# Patient Record
Sex: FEMALE | Race: WHITE | NOT HISPANIC OR LATINO | Employment: UNEMPLOYED | URBAN - METROPOLITAN AREA
[De-identification: names, ages, dates, MRNs, and addresses within clinical notes are randomized per-mention and may not be internally consistent; named-entity substitution may affect disease eponyms.]

---

## 2010-11-16 LAB
EXT 24 HR UR METANEPHRINE: ABNORMAL
EXT 24 HR UR NORMETANEPHRINE: ABNORMAL
EXT 24 HR UR NORMETANEPHRINE: ABNORMAL
EXT 25 HYDROXY VIT D2: ABNORMAL
EXT 25 HYDROXY VIT D3: ABNORMAL
EXT 5 HIAA 24 HR URINE: ABNORMAL
EXT 5 HIAA BLOOD: ABNORMAL
EXT ACTH: ABNORMAL
EXT AFP: ABNORMAL
EXT ALBUMIN: ABNORMAL
EXT ALKALINE PHOSPHATASE: ABNORMAL
EXT ALT: ABNORMAL
EXT AMYLASE: ABNORMAL
EXT ANTI ISLET CELL AB: ABNORMAL
EXT ANTI PARIETAL CELL AB: ABNORMAL
EXT ANTI THYROID AB: ABNORMAL
EXT AST: ABNORMAL
EXT BILIRUBIN DIRECT: ABNORMAL
EXT BILIRUBIN TOTAL: ABNORMAL
EXT BK VIRUS DNA QN PCR: ABNORMAL
EXT BUN: ABNORMAL
EXT C PEPTIDE: ABNORMAL
EXT CA 125: ABNORMAL
EXT CA 19-9: ABNORMAL
EXT CA 27-29: ABNORMAL
EXT CALCITONIN: ABNORMAL
EXT CALCIUM: ABNORMAL
EXT CEA: ABNORMAL
EXT CHLORIDE: ABNORMAL
EXT CHOLESTEROL: ABNORMAL
EXT CHROMOGRANIN A: ABNORMAL
EXT CO2: ABNORMAL
EXT CREATININE UA: ABNORMAL
EXT CREATININE: ABNORMAL
EXT CYCLOSPORONE LEVEL: ABNORMAL
EXT DOPAMINE: ABNORMAL
EXT EBV DNA BY PCR: ABNORMAL
EXT EPINEPHRINE: ABNORMAL
EXT FOLATE: ABNORMAL
EXT FREE T3: ABNORMAL
EXT FREE T4: ABNORMAL
EXT FSH: ABNORMAL
EXT GASTRIN RELEASING PEPTIDE: ABNORMAL
EXT GASTRIN RELEASING PEPTIDE: ABNORMAL
EXT GASTRIN: ABNORMAL
EXT GGT: ABNORMAL
EXT GHRELIN: ABNORMAL
EXT GLUCAGON: ABNORMAL
EXT GLUCOSE: ABNORMAL
EXT GROWTH HORMONE: ABNORMAL
EXT HCV RNA QUANT PCR: ABNORMAL
EXT HDL: ABNORMAL
EXT HEMATOCRIT: ABNORMAL
EXT HEMOGLOBIN A1C: ABNORMAL
EXT HEMOGLOBIN: ABNORMAL
EXT HISTAMINE 24 HR URINE: ABNORMAL
EXT HISTAMINE: ABNORMAL
EXT IGF-1: ABNORMAL
EXT IMMUNKNOW (NON-STIMULATED): ABNORMAL
EXT IMMUNKNOW (STIMULATED): ABNORMAL
EXT INR: ABNORMAL
EXT INSULIN: ABNORMAL
EXT LANREOTIDE LEVEL: ABNORMAL
EXT LDH, TOTAL: ABNORMAL
EXT LDL CHOLESTEROL: ABNORMAL
EXT LIPASE: ABNORMAL
EXT MAGNESIUM: ABNORMAL
EXT METANEPHRINE FREE PLASMA: ABNORMAL
EXT MOTILIN: ABNORMAL
EXT NEUROKININ A CAMB: ABNORMAL
EXT NEUROKININ A ISI: ABNORMAL
EXT NEUROTENSIN: ABNORMAL
EXT NOREPINEPHRINE: ABNORMAL
EXT NORMETANEPHRINE: ABNORMAL
EXT NSE: ABNORMAL
EXT OCTREOTIDE LEVEL: ABNORMAL
EXT PANCREASTATIN CAMB: ABNORMAL
EXT PANCREASTATIN ISI: ABNORMAL
EXT PANCREATIC POLYPEPTIDE: ABNORMAL
EXT PHOSPHORUS: ABNORMAL
EXT PLATELETS: ABNORMAL
EXT POTASSIUM: ABNORMAL
EXT PROGRAF LEVEL: ABNORMAL
EXT PROLACTIN: ABNORMAL
EXT PROTEIN TOTAL: ABNORMAL
EXT PROTEIN UA: ABNORMAL
EXT PT: ABNORMAL
EXT PTH, INTACT: ABNORMAL
EXT PTT: ABNORMAL
EXT RAPAMUNE LEVEL: ABNORMAL
EXT SEROTONIN: ABNORMAL
EXT SODIUM: ABNORMAL
EXT SOMATOSTATIN: ABNORMAL
EXT SUBSTANCE P: ABNORMAL
EXT TRIGLYCERIDES: ABNORMAL
EXT TRYPTASE: ABNORMAL
EXT TSH: ABNORMAL
EXT URIC ACID: ABNORMAL
EXT URINE AMYLASE U/HR: ABNORMAL
EXT URINE AMYLASE U/L: ABNORMAL
EXT VASOACTIVE INTESTINAL POLYPEPTIDE: ABNORMAL
EXT VITAMIN B12: ABNORMAL
EXT VMA 24 HR URINE: ABNORMAL
EXT WBC: ABNORMAL
NEURON SPECIFIC ENOLASE: ABNORMAL
VITAMIN D, 25-OH, TOTAL: 18.2 NG/ML (ref 32–100)

## 2020-11-18 LAB

## 2021-02-23 LAB

## 2021-03-18 ENCOUNTER — DOCUMENTATION ONLY (OUTPATIENT)
Dept: NEUROLOGY | Facility: HOSPITAL | Age: 46
End: 2021-03-18

## 2021-03-24 DIAGNOSIS — C7A.020 MALIGNANT CARCINOID TUMOR OF APPENDIX: Primary | ICD-10-CM

## 2021-03-24 DIAGNOSIS — C26.9 MALIGNANT NEOPLASM OF ILL-DEFINED SITES WITHIN THE DIGESTIVE SYSTEM: ICD-10-CM

## 2021-03-29 ENCOUNTER — TELEPHONE (OUTPATIENT)
Dept: NEUROLOGY | Facility: HOSPITAL | Age: 46
End: 2021-03-29

## 2021-03-31 ENCOUNTER — LAB VISIT (OUTPATIENT)
Dept: LAB | Facility: HOSPITAL | Age: 46
End: 2021-03-31
Attending: FAMILY MEDICINE
Payer: COMMERCIAL

## 2021-03-31 ENCOUNTER — HOSPITAL ENCOUNTER (OUTPATIENT)
Dept: RADIOLOGY | Facility: HOSPITAL | Age: 46
Discharge: HOME | End: 2021-03-31
Attending: SURGERY
Payer: COMMERCIAL

## 2021-03-31 DIAGNOSIS — C7A.020 MALIGNANT CARCINOID TUMOR OF THE APPENDIX: Primary | ICD-10-CM

## 2021-03-31 DIAGNOSIS — C26.9 MALIGNANT NEOPLASM OF ILL-DEFINED SITES WITHIN THE DIGESTIVE SYSTEM: ICD-10-CM

## 2021-03-31 LAB
ALBUMIN SERPL BCP-MCNC: 4.3 G/DL (ref 3.5–5.2)
ALP SERPL-CCNC: 66 U/L (ref 55–135)
ALT SERPL W/O P-5'-P-CCNC: 104 U/L (ref 10–44)
ANION GAP SERPL CALC-SCNC: 12 MMOL/L (ref 8–16)
AST SERPL-CCNC: 98 U/L (ref 10–40)
BILIRUB SERPL-MCNC: 1 MG/DL (ref 0.1–1)
BUN SERPL-MCNC: 12 MG/DL (ref 6–20)
CALCIUM SERPL-MCNC: 9.8 MG/DL (ref 8.7–10.5)
CHLORIDE SERPL-SCNC: 101 MMOL/L (ref 95–110)
CO2 SERPL-SCNC: 22 MMOL/L (ref 23–29)
CREAT SERPL-MCNC: 0.7 MG/DL (ref 0.5–1.4)
EST. GFR  (AFRICAN AMERICAN): >60 ML/MIN/1.73 M^2
EST. GFR  (NON AFRICAN AMERICAN): >60 ML/MIN/1.73 M^2
GLUCOSE SERPL-MCNC: 96 MG/DL (ref 70–110)
POTASSIUM SERPL-SCNC: 4.4 MMOL/L (ref 3.5–5.1)
PROT SERPL-MCNC: 7.7 G/DL (ref 6–8.4)
SODIUM SERPL-SCNC: 135 MMOL/L (ref 136–145)

## 2021-03-31 PROCEDURE — 74177 CT ABD & PELVIS W/CONTRAST: CPT | Mod: TC,PO

## 2021-03-31 PROCEDURE — 83519 RIA NONANTIBODY: CPT | Performed by: FAMILY MEDICINE

## 2021-03-31 PROCEDURE — 80053 COMPREHEN METABOLIC PANEL: CPT | Performed by: FAMILY MEDICINE

## 2021-03-31 PROCEDURE — 36415 COLL VENOUS BLD VENIPUNCTURE: CPT | Performed by: FAMILY MEDICINE

## 2021-03-31 PROCEDURE — 25500020 PHARM REV CODE 255: Mod: PO | Performed by: SURGERY

## 2021-03-31 PROCEDURE — 84260 ASSAY OF SEROTONIN: CPT | Performed by: FAMILY MEDICINE

## 2021-03-31 PROCEDURE — 30000890 LABCORP MISCELLANEOUS TEST: Performed by: FAMILY MEDICINE

## 2021-03-31 RX ADMIN — IOHEXOL 100 ML: 350 INJECTION, SOLUTION INTRAVENOUS at 08:03

## 2021-04-01 ENCOUNTER — TELEPHONE (OUTPATIENT)
Dept: NEUROLOGY | Facility: HOSPITAL | Age: 46
End: 2021-04-01

## 2021-04-01 ENCOUNTER — OFFICE VISIT (OUTPATIENT)
Dept: NEUROLOGY | Facility: HOSPITAL | Age: 46
End: 2021-04-01
Attending: SURGERY
Payer: COMMERCIAL

## 2021-04-01 VITALS
SYSTOLIC BLOOD PRESSURE: 132 MMHG | HEIGHT: 68 IN | WEIGHT: 223.44 LBS | DIASTOLIC BLOOD PRESSURE: 83 MMHG | TEMPERATURE: 99 F | HEART RATE: 88 BPM | BODY MASS INDEX: 33.86 KG/M2

## 2021-04-01 DIAGNOSIS — C26.9 MALIGNANT NEOPLASM OF ILL-DEFINED SITES WITHIN THE DIGESTIVE SYSTEM: Primary | ICD-10-CM

## 2021-04-01 DIAGNOSIS — C7A.020 MALIGNANT CARCINOID TUMOR OF APPENDIX: ICD-10-CM

## 2021-04-01 DIAGNOSIS — C7A.020 MALIGNANT CARCINOID TUMOR OF APPENDIX: Primary | ICD-10-CM

## 2021-04-01 PROCEDURE — 99213 OFFICE O/P EST LOW 20 MIN: CPT | Performed by: SURGERY

## 2021-04-01 RX ORDER — MONTELUKAST SODIUM 10 MG/1
TABLET ORAL
COMMUNITY
Start: 2020-10-21

## 2021-04-01 RX ORDER — CETIRIZINE HYDROCHLORIDE 10 MG/1
10 TABLET ORAL
COMMUNITY
Start: 2020-10-21

## 2021-04-01 RX ORDER — ACETAMINOPHEN 500 MG
5000 TABLET ORAL
COMMUNITY

## 2021-04-01 RX ORDER — METFORMIN HYDROCHLORIDE 500 MG/1
TABLET ORAL
COMMUNITY
Start: 2021-01-13

## 2021-04-01 RX ORDER — LEVOTHYROXINE SODIUM 175 UG/1
TABLET ORAL
COMMUNITY
Start: 2020-10-21 | End: 2022-04-05

## 2021-04-01 RX ORDER — EZETIMIBE 10 MG/1
TABLET ORAL
COMMUNITY
Start: 2020-10-21 | End: 2021-10-04

## 2021-04-01 RX ORDER — ROSUVASTATIN CALCIUM 10 MG/1
10 TABLET, COATED ORAL
COMMUNITY
Start: 2021-01-13 | End: 2022-01-13

## 2021-04-09 LAB — SEROTONIN SER-MCNC: 5 NG/ML (ref 0–420)

## 2021-04-15 LAB
LABCORP MISC TEST CODE: NORMAL
LABCORP MISC TEST NAME: NORMAL
LABCORP MISCELLANEOUS TEST: NORMAL

## 2021-04-24 PROBLEM — C7A.020 MALIGNANT CARCINOID TUMOR OF APPENDIX: Status: ACTIVE | Noted: 2021-04-24

## 2021-06-14 ENCOUNTER — TELEPHONE (OUTPATIENT)
Dept: NEUROLOGY | Facility: HOSPITAL | Age: 46
End: 2021-06-14

## 2021-09-02 ENCOUNTER — PATIENT MESSAGE (OUTPATIENT)
Dept: NEUROLOGY | Facility: HOSPITAL | Age: 46
End: 2021-09-02

## 2021-09-02 ENCOUNTER — NURSE TRIAGE (OUTPATIENT)
Dept: ADMINISTRATIVE | Facility: CLINIC | Age: 46
End: 2021-09-02

## 2021-09-03 LAB
EXT 24 HR UR METANEPHRINE: ABNORMAL
EXT 24 HR UR NORMETANEPHRINE: ABNORMAL
EXT 24 HR UR NORMETANEPHRINE: ABNORMAL
EXT 25 HYDROXY VIT D2: ABNORMAL
EXT 25 HYDROXY VIT D3: ABNORMAL
EXT 5 HIAA 24 HR URINE: ABNORMAL
EXT 5 HIAA BLOOD: ABNORMAL
EXT ACTH: ABNORMAL
EXT AFP: ABNORMAL
EXT ALBUMIN: 4.7 G/DL (ref 3.8–4.8)
EXT ALKALINE PHOSPHATASE: 73 IU/L (ref 48–121)
EXT ALT: 67 IU/L (ref 0–32)
EXT AMYLASE: ABNORMAL
EXT ANTI ISLET CELL AB: ABNORMAL
EXT ANTI PARIETAL CELL AB: ABNORMAL
EXT ANTI THYROID AB: ABNORMAL
EXT AST: 65 IU/L (ref 0–40)
EXT BILIRUBIN DIRECT: ABNORMAL
EXT BILIRUBIN TOTAL: 0.3 MG/DL (ref 0–1.2)
EXT BK VIRUS DNA QN PCR: ABNORMAL
EXT BUN: 14 MG/DL (ref 6–24)
EXT C PEPTIDE: ABNORMAL
EXT CA 125: ABNORMAL
EXT CA 19-9: ABNORMAL
EXT CA 27-29: ABNORMAL
EXT CALCITONIN: ABNORMAL
EXT CALCIUM: 10.4 MG/DL (ref 8.7–10.2)
EXT CEA: ABNORMAL
EXT CHLORIDE: 103 MMOL/L (ref 96–106)
EXT CHOLESTEROL: ABNORMAL
EXT CHROMOGRANIN A: ABNORMAL
EXT CO2: 23 MMOL/L (ref 20–29)
EXT CREATININE UA: ABNORMAL
EXT CREATININE: 0.7 MG/DL (ref 0.57–1)
EXT CYCLOSPORONE LEVEL: ABNORMAL
EXT DOPAMINE: ABNORMAL
EXT EBV DNA BY PCR: ABNORMAL
EXT EPINEPHRINE: ABNORMAL
EXT FOLATE: ABNORMAL
EXT FREE T3: ABNORMAL
EXT FREE T4: ABNORMAL
EXT FSH: ABNORMAL
EXT GASTRIN RELEASING PEPTIDE: ABNORMAL
EXT GASTRIN RELEASING PEPTIDE: ABNORMAL
EXT GASTRIN: ABNORMAL
EXT GGT: ABNORMAL
EXT GHRELIN: ABNORMAL
EXT GLUCAGON: ABNORMAL
EXT GLUCOSE: 95 MG/DL (ref 65–99)
EXT GROWTH HORMONE: ABNORMAL
EXT HCV RNA QUANT PCR: ABNORMAL
EXT HDL: ABNORMAL
EXT HEMATOCRIT: 39 % (ref 34–46.6)
EXT HEMOGLOBIN A1C: ABNORMAL
EXT HEMOGLOBIN: 12.5 G/DL (ref 11.1–15.9)
EXT HISTAMINE 24 HR URINE: ABNORMAL
EXT HISTAMINE: ABNORMAL
EXT IGF-1: ABNORMAL
EXT IMMUNKNOW (NON-STIMULATED): ABNORMAL
EXT IMMUNKNOW (STIMULATED): ABNORMAL
EXT INR: ABNORMAL
EXT INSULIN: ABNORMAL
EXT LANREOTIDE LEVEL: ABNORMAL
EXT LDH, TOTAL: ABNORMAL
EXT LDL CHOLESTEROL: ABNORMAL
EXT LIPASE: ABNORMAL
EXT MAGNESIUM: ABNORMAL
EXT METANEPHRINE FREE PLASMA: ABNORMAL
EXT MOTILIN: ABNORMAL
EXT NEUROKININ A CAMB: ABNORMAL
EXT NEUROKININ A ISI: ABNORMAL
EXT NEUROTENSIN: ABNORMAL
EXT NOREPINEPHRINE: ABNORMAL
EXT NORMETANEPHRINE: ABNORMAL
EXT NSE: ABNORMAL
EXT OCTREOTIDE LEVEL: ABNORMAL
EXT PANCREASTATIN CAMB: ABNORMAL
EXT PANCREASTATIN ISI: 60 PG/ML (ref 10–135)
EXT PANCREATIC POLYPEPTIDE: ABNORMAL
EXT PHOSPHORUS: ABNORMAL
EXT PLATELETS: 304 X10E3/UL (ref 150–450)
EXT POTASSIUM: 4.4 MMOL/L (ref 3.5–5.2)
EXT PROGRAF LEVEL: ABNORMAL
EXT PROLACTIN: ABNORMAL
EXT PROTEIN TOTAL: 7.3 G/DL (ref 6–8.5)
EXT PROTEIN UA: ABNORMAL
EXT PT: ABNORMAL
EXT PTH, INTACT: ABNORMAL
EXT PTT: ABNORMAL
EXT RAPAMUNE LEVEL: ABNORMAL
EXT SEROTONIN: 7 NG/ML (ref 0–420)
EXT SODIUM: 142 MMOL/L (ref 134–144)
EXT SOMATOSTATIN: ABNORMAL
EXT SUBSTANCE P: ABNORMAL
EXT TRIGLYCERIDES: ABNORMAL
EXT TRYPTASE: ABNORMAL
EXT TSH: ABNORMAL
EXT URIC ACID: ABNORMAL
EXT URINE AMYLASE U/HR: ABNORMAL
EXT URINE AMYLASE U/L: ABNORMAL
EXT VASOACTIVE INTESTINAL POLYPEPTIDE: ABNORMAL
EXT VITAMIN B12: ABNORMAL
EXT VMA 24 HR URINE: ABNORMAL
EXT WBC: 7.7 X10E3/UL (ref 3.4–10.8)
NEURON SPECIFIC ENOLASE: ABNORMAL

## 2021-10-04 ENCOUNTER — OFFICE VISIT (OUTPATIENT)
Dept: NEUROLOGY | Facility: HOSPITAL | Age: 46
End: 2021-10-04
Attending: SURGERY
Payer: COMMERCIAL

## 2021-10-04 ENCOUNTER — PATIENT MESSAGE (OUTPATIENT)
Dept: NEUROLOGY | Facility: HOSPITAL | Age: 46
End: 2021-10-04

## 2021-10-04 ENCOUNTER — HOSPITAL ENCOUNTER (OUTPATIENT)
Dept: RADIOLOGY | Facility: HOSPITAL | Age: 46
Discharge: HOME | End: 2021-10-04
Attending: SURGERY
Payer: COMMERCIAL

## 2021-10-04 VITALS
HEART RATE: 94 BPM | WEIGHT: 214.31 LBS | RESPIRATION RATE: 16 BRPM | TEMPERATURE: 99 F | SYSTOLIC BLOOD PRESSURE: 152 MMHG | OXYGEN SATURATION: 97 % | BODY MASS INDEX: 32.48 KG/M2 | DIASTOLIC BLOOD PRESSURE: 83 MMHG | HEIGHT: 68 IN

## 2021-10-04 DIAGNOSIS — K76.0 FATTY LIVER: ICD-10-CM

## 2021-10-04 DIAGNOSIS — Z12.89 ENCOUNTER FOR SCREENING FOR MALIGNANT NEOPLASM OF OTHER SITES: ICD-10-CM

## 2021-10-04 DIAGNOSIS — C7A.020 MALIGNANT CARCINOID TUMOR OF APPENDIX: ICD-10-CM

## 2021-10-04 DIAGNOSIS — C7A.020 MALIGNANT CARCINOID TUMOR OF APPENDIX: Primary | ICD-10-CM

## 2021-10-04 DIAGNOSIS — C26.9 MALIGNANT NEOPLASM OF ILL-DEFINED SITES WITHIN THE DIGESTIVE SYSTEM: ICD-10-CM

## 2021-10-04 LAB
CREAT SERPL-MCNC: 0.6 MG/DL (ref 0.5–1.4)
SAMPLE: NORMAL

## 2021-10-04 PROCEDURE — 25500020 PHARM REV CODE 255: Performed by: SURGERY

## 2021-10-04 PROCEDURE — G1004 CDSM NDSC: HCPCS

## 2021-10-04 PROCEDURE — 99215 OFFICE O/P EST HI 40 MIN: CPT | Mod: 25 | Performed by: SURGERY

## 2021-10-04 PROCEDURE — G1004 CT ABDOMEN PELVIS WITH CONTRAST: ICD-10-PCS | Mod: ,,, | Performed by: RADIOLOGY

## 2021-10-04 PROCEDURE — 74177 CT ABDOMEN PELVIS WITH CONTRAST: ICD-10-PCS | Mod: 26,MG,, | Performed by: RADIOLOGY

## 2021-10-04 PROCEDURE — G1004 CDSM NDSC: HCPCS | Mod: ,,, | Performed by: RADIOLOGY

## 2021-10-04 PROCEDURE — 74177 CT ABD & PELVIS W/CONTRAST: CPT | Mod: 26,MG,, | Performed by: RADIOLOGY

## 2021-10-04 RX ADMIN — IOHEXOL 100 ML: 350 INJECTION, SOLUTION INTRAVENOUS at 10:10

## 2021-10-05 ENCOUNTER — PATIENT MESSAGE (OUTPATIENT)
Dept: NEUROLOGY | Facility: HOSPITAL | Age: 46
End: 2021-10-05

## 2021-10-05 ENCOUNTER — TELEPHONE (OUTPATIENT)
Dept: NEUROLOGY | Facility: HOSPITAL | Age: 46
End: 2021-10-05

## 2022-04-04 ENCOUNTER — TELEPHONE (OUTPATIENT)
Dept: NEUROLOGY | Facility: HOSPITAL | Age: 47
End: 2022-04-04
Payer: COMMERCIAL

## 2022-04-04 NOTE — TELEPHONE ENCOUNTER
Pt requesting to reschedule appt from tomorrow, 4/5/22, to later this week.  Pt notified, no available appts later this week.  Attempted to reschedule to later this month, pt declined, pt will contact clinic if weather is bad and reschedule to later this month.

## 2022-04-04 NOTE — TELEPHONE ENCOUNTER
----- Message from [place] sent at 4/4/2022  9:24 AM CDT -----  Contact: [phone]  Type:  Needs Medical Advice    Who Called: Pt  Reason for call: regarding if it is possible to reschedule her appt for later this week due to bad weather on tomorrow, and if not pt will keep her appt on tomorrow  Would the patient rather a call back or a response via [facility]?  Call back  Best Call Back Number: [phone]

## 2022-04-04 NOTE — PROGRESS NOTES
" NOLANETS:  [facility]      PATIENT: Emma Fatima  MRN: [id number]  DATE: 2022    Subjective:      Chief Complaint: Consult  for appendiceal neuroendocrine tumor.    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  She underwent a radical hysterectomy.  During the procedure, an appendectomy was performed.  The pathology showed an incidental carcinoid tumor.  The margin was positive.  She was taken back for a completion right hemicolectomy.  No residual tumor was found.  She has recovered without incident.    Interval History:     Vitals: Blood pressure 133/84, pulse 78, temperature 98.5 °F (36.9 °C), temperature source Oral, resp. rate 18, height 5' 8" (1.727 m), weight 100.8 kg (222 lb 5.3 oz). 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    Allergies:  Peanut, Sulfamethoxazole-trimethoprim, Sulfa (sulfonamide antibiotics), and Tree nut    Medications:  Current Outpatient Medications   Medication Sig    cetirizine (ZYRTEC) 10 MG tablet Take 10 mg by mouth.    cholecalciferol, vitamin D3, 125 mcg (5,000 unit) Tab Take 5,000 Units by mouth.    coenzyme Q10 10 mg capsule Take by mouth.    " EPINEPHrine (EPIPEN) 0.3 mg/0.3 mL AtIn Inject 0.3 mg into the muscle.    ezetimibe (ZETIA) 10 mg tablet Zetia 10 mg tablet   Take 1 tablet every day by oral route.    FLUoxetine 20 MG capsule Take 60 mg by mouth once daily.    fluoxetine HCl (PROZAC ORAL) Take 60 mg by mouth.    metFORMIN (GLUCOPHAGE) 500 MG tablet metformin 500 mg tablet    montelukast (SINGULAIR) 10 mg tablet montelukast 10 mg tablet   TAKE 1 TABLET BY MOUTH EVERY NIGHT    neomycin (MYCIFRADIN) 500 mg Tab neomycin 500 mg tablet    SYNTHROID 150 mcg tablet     rosuvastatin (CRESTOR) 10 MG tablet Take 10 mg by mouth.     No current facility-administered medications for this visit.        Review of Systems   Constitutional: Positive for diaphoresis and fatigue.   HENT: Negative for facial swelling and hearing loss.    Eyes: Negative for photophobia and visual disturbance.   Respiratory: Positive for shortness of breath.    Cardiovascular: Positive for palpitations.   Gastrointestinal: Positive for abdominal pain. Negative for nausea and vomiting.   Endocrine: Negative for cold intolerance.   Genitourinary: Negative for difficulty urinating and dysuria.   Musculoskeletal: Negative for myalgias and neck pain.   Skin: Negative for rash and wound.   Allergic/Immunologic: Negative for immunocompromised state.   Neurological: Negative for tremors and weakness.   Hematological: Does not bruise/bleed easily.   Psychiatric/Behavioral: Negative.           Objective:      Physical Exam  Constitutional:       General: She is not in acute distress.     Appearance: Normal appearance. She is obese. She is not ill-appearing, toxic-appearing or diaphoretic.   HENT:      Head: Normocephalic and atraumatic.   Eyes:      Pupils: Pupils are equal, round, and reactive to light.   Cardiovascular:      Rate and Rhythm: Normal rate and regular rhythm.   Pulmonary:      Effort: Pulmonary effort is normal. No respiratory distress.      Breath sounds: No stridor.    Abdominal:      General: Abdomen is flat.   Musculoskeletal:         General: No swelling.   Skin:     General: Skin is warm.      Coloration: Skin is not jaundiced.   Neurological:      General: No focal deficit present.      Mental Status: She is alert and oriented to person, place, and time.   Psychiatric:         Mood and Affect: Mood normal.         Behavior: Behavior normal.         Thought Content: Thought content normal.         Judgment: Judgment normal.          Laboratory Data:  Neuroendocrine Labs Latest Ref Rng & Units 4/5/2022   GLUCOSE 70 - 110 mg/dL 118 (H)   EXT GLUCOSE 65 - 99 mg/dL    BUN 6 - 20 mg/dL 11   EXT BUN 6 - 24 mg/dL    CREATININE 0.5 - 1.4 mg/dL 0.7   EXT CREATININE 0.57 - 1.00 mg/dL     - 145 mmol/L 140   EXT  - 144 mmol/L    K 3.5 - 5.1 mmol/L 4.2   EXT K 3.5 - 5.2 mmol/L    CHLORIDE 95 - 110 mmol/L 107   EXT CHLORIDE 96 - 106 mmol/L    CO2 23 - 29 mmol/L 27   EXT CO2 20 - 29 mmol/L    CALCIUM 8.7 - 10.5 mg/dL 10.0   EXT CALCIUM 8.7 - 10.2 mg/dL    PROTEIN, TOTAL 6.0 - 8.4 g/dL 7.6   EXT PROTEIN, TOTAL 6.0 - 8.5 g/dL    ALBUMIN 3.5 - 5.2 g/dL 4.1   EXT ALBUMIN 3.8 - 4.8 g/dL    TOTAL BILIRUBIN 0.1 - 1.0 mg/dL 0.5   EXT TOTAL BILIRUBIN 0.0 - 1.2 mg/dL    ALK PHOSPHATASE 55 - 135 U/L 76   EXT ALK PHOSPHATASE 48 - 121 IU/L    SGOT (AST) 10 - 40 U/L 53 (H)   EXT SGOT (AST) 0 - 40 IU/L    SGPT (ALT) 10 - 44 U/L 71 (H)     Neuroendocrine Labs Latest Ref Rng & Units 9/3/2021   EXT PANCREASTATIN DULCE MARIA 10 - 135 pg/mL 60       Neuroendocrine Labs Latest Ref Rng & Units 3/31/2021   GLUCOSE 70 - 110 mg/dL 96   BUN 6 - 20 mg/dL 12   CREATININE 0.5 - 1.4 mg/dL 0.7    - 145 mmol/L 135 (L)   K 3.5 - 5.1 mmol/L 4.4   CHLORIDE 95 - 110 mmol/L 101   CO2 23 - 29 mmol/L 22 (L)   CALCIUM 8.7 - 10.5 mg/dL 9.8   PROTEIN, TOTAL 6.0 - 8.4 g/dL 7.7   ALBUMIN 3.5 - 5.2 g/dL 4.3   TOTAL BILIRUBIN 0.1 - 1.0 mg/dL 1.0   ALK PHOSPHATASE 55 - 135 U/L 66   SGOT (AST) 10 - 40 U/L 98 (H)   SGPT (ALT)  10 - 44 U/L 104 (H)     Scans:     CT Abdomen Pelvis With Contrast - Triple Phase  Narrative: EXAMINATION:  CT ABDOMEN PELVIS WITH CONTRAST    CLINICAL HISTORY:  restage; Malignant carcinoid tumor of the appendix    TECHNIQUE:  Low dose axial images, sagittal and coronal reformations were obtained from the lung bases to the pubic symphysis following the IV administration of 100 mL of Omnipaque 350 arterial, portal venous, and delayed phases were acquired over the abdomen.    COMPARISON:  10/04/2021    FINDINGS:  Lung Bases: Unremarkable.    Liver: Stable 13 mm arterially enhancing lesion in the left hepatic lobe lateral segment.  Stable surgical clips along the capsular surface of the right hepatic lobe.    Gallbladder: Surgically absent    Pancreas: Unremarkable.    Spleen: Unremarkable.    Adrenals: Unremarkable.    Kidneys/ Ureters: Unremarkable.    GI Tract/Mesentery: Postoperative changes of appendectomy, cecal resection, and ileocolic anastomosis.  Otherwise unremarkable.    Pelvic viscera: Hysterectomy changes.  Urinary bladder unremarkable.    Vasculature: Unremarkable.    Bones: Unremarkable.  Impression: Stable nonspecific left hepatic lobe enhancing lesion.  Continued follow-up suggested.    Stable postoperative changes as detailed above.    Electronically signed by: Miguel Henry Jr  Date:    04/05/2022  Time:    11:17           Assessment/Impression:         Problem List Items Addressed This Visit    None          Plan:     I had a long conversation with the patient about the features of her case that support the treatment and surveillance recommendations outlined below:  1.  She had a small area of carcinoid tumor in an appendix taken during a hysterectomy.  The hysterectomy was being done for an early stage endometrial cancer.  I reviewed with her the reasons why we choose additional surgery in a patient with an incidentally discovered tumor within the appendix.  The most common rationale for  additional surgery is an incomplete excision of the abnormality.  Other indications would be a tumor greater than 2 cm, a higher grade tumor, or a tumor with evidence of mesoappendix seal involvement.  I do not have the final pathology but according to the patient it was a micro carcinoid and the margin was positive.  She underwent a right hemicolectomy.  There was no residual tumor found within the specimen and the lymph nodes were negative.  Other than surveillance cross-sectional imaging in 3 months and then again a year after complete resection, there would be no long-term surveillance required for this diagnosis.  The natural history of a grade 1, well-differentiated, appendiceal carcinoid tumor include a slow in indolent biologic behavior and patients can live a long time even when all measurable disease cannot be removed.  In her case she had an early stage, node negative primary.  The only risk factor that would contribute to local recurrence is the T3 involvement.  2. She had a CT scan the abdomen pelvis with contrast done prior to this visit.  There showing an area within the left lateral segment that I think is most consistent with a vascular malformation.  It is unchanged from prior imaging.  Unless it were to change in imaging characteristics, I do not believe that an MRI or PET scan would be required to clarify this point.  3. I do not have updated neuroendocrine blood work, but her postoperative blood work documented a tumor marker in the normal range.  Serologic surveillance as well as cross-sectional imaging is indicated up to 2 years an appendiceal primaries.  Given her 3 cancer diagnoses under the age of 50, I believe that some type of surveillance blood work and imaging is warranted.  2.  She has already undergone genetic counseling and genetic testing.  According to the patient she had mutations that were of unclear clinical significance.  She did not have any of the known mutations that would  change significantly her cancer surveillance.    CT scan the abdomen pelvis with contrast in 1 year  Neuroendocrine blood work in 1 year      Radha Ortiz MD, MPH, FACS  Professor of Surgery, Surgical Oncology, and Hepatobiliary Oncology  Chief, [facility] Division of Surgery Oncology  Medical Director, [facility]  Phone: [phone]  Phone: [phone]  Email: [email or web address]

## 2022-04-05 ENCOUNTER — HOSPITAL ENCOUNTER (OUTPATIENT)
Dept: RADIOLOGY | Facility: HOSPITAL | Age: 47
Discharge: HOME | End: 2022-04-05
Attending: SURGERY
Payer: COMMERCIAL

## 2022-04-05 ENCOUNTER — OFFICE VISIT (OUTPATIENT)
Dept: NEUROLOGY | Facility: HOSPITAL | Age: 47
End: 2022-04-05
Attending: SURGERY
Payer: COMMERCIAL

## 2022-04-05 VITALS
HEART RATE: 78 BPM | WEIGHT: 222.31 LBS | HEIGHT: 68 IN | SYSTOLIC BLOOD PRESSURE: 133 MMHG | RESPIRATION RATE: 18 BRPM | DIASTOLIC BLOOD PRESSURE: 84 MMHG | TEMPERATURE: 99 F | BODY MASS INDEX: 33.69 KG/M2

## 2022-04-05 DIAGNOSIS — C7A.020 MALIGNANT CARCINOID TUMOR OF APPENDIX: Primary | ICD-10-CM

## 2022-04-05 DIAGNOSIS — K76.0 FATTY LIVER: ICD-10-CM

## 2022-04-05 LAB
CREAT SERPL-MCNC: 0.6 MG/DL (ref 0.5–1.4)
SAMPLE: NORMAL

## 2022-04-05 PROCEDURE — 99215 OFFICE O/P EST HI 40 MIN: CPT | Mod: 25 | Performed by: SURGERY

## 2022-04-05 PROCEDURE — 74177 CT ABD & PELVIS W/CONTRAST: CPT | Mod: 26,,, | Performed by: RADIOLOGY

## 2022-04-05 PROCEDURE — 74177 CT ABD & PELVIS W/CONTRAST: CPT | Mod: TC

## 2022-04-05 PROCEDURE — 25500020 PHARM REV CODE 255: Performed by: SURGERY

## 2022-04-05 PROCEDURE — 74177 CT ABDOMEN PELVIS WITH CONTRAST: ICD-10-PCS | Mod: 26,,, | Performed by: RADIOLOGY

## 2022-04-05 RX ORDER — NEOMYCIN SULFATE 500 MG/1
TABLET ORAL
COMMUNITY

## 2022-04-05 RX ORDER — FLUOXETINE HYDROCHLORIDE 20 MG/1
60 CAPSULE ORAL DAILY
COMMUNITY
Start: 2022-03-19

## 2022-04-05 RX ORDER — AA/PROT/LYSINE/METHIO/VIT C/B6 50-12.5 MG
TABLET ORAL
COMMUNITY

## 2022-04-05 RX ORDER — EZETIMIBE 10 MG/1
TABLET ORAL
COMMUNITY

## 2022-04-05 RX ORDER — LEVOTHYROXINE SODIUM 150 MCG
TABLET ORAL
COMMUNITY
Start: 2022-03-12

## 2022-04-05 RX ORDER — EPINEPHRINE 0.3 MG/.3ML
0.3 INJECTION SUBCUTANEOUS
COMMUNITY
Start: 2021-08-19 | End: 2022-08-19

## 2022-04-05 RX ADMIN — IOHEXOL 100 ML: 350 INJECTION, SOLUTION INTRAVENOUS at 10:04

## 2022-04-05 NOTE — PATIENT INSTRUCTIONS
Return to clinic and labs: [facility].    Monday, April 3, 2023 at 850am for labs.    Monday, April 3, 2023 at 9am for CT    Monday, April 3, 2023 at 1130 with Dr. Ortiz

## 2022-09-27 ENCOUNTER — PATIENT MESSAGE (OUTPATIENT)
Dept: HEMATOLOGY/ONCOLOGY | Facility: CLINIC | Age: 47
End: 2022-09-27
Payer: COMMERCIAL

## 2022-09-27 ENCOUNTER — TELEPHONE (OUTPATIENT)
Dept: HEMATOLOGY/ONCOLOGY | Facility: CLINIC | Age: 47
End: 2022-09-27
Payer: COMMERCIAL

## 2022-09-27 NOTE — TELEPHONE ENCOUNTER
----- Message from Tracie M. Weil-Cavalier, RD sent at 9/27/2022 11:13 AM CDT -----  Regarding: Appt with Dr Ortiz on 4/3/2023  Attempted to call patient regarding need to reschedule their appointment with Dr. Radha Ortiz MD However, no answer. Call outcome: Left voice message with clinic number for return call.  Patient notified of canceled appointment.  Portal message sent.  Staff message sent to contact patient for rescheduling.      Future Appointments  4/3/2023   8:50 AM    APPOINTMENT LABALFRED# [facility] LAB            [facility]  4/3/2023   10:00 AM   [facility] CT2 LIMIT 450 LBS     [facility] CT SCAN        [facility]  4/3/2023   11:30 AM   Radha Ortiz MD       [facility] TUMOR          [facility]

## 2022-09-27 NOTE — TELEPHONE ENCOUNTER
RE: Reschedule Appointment Call     Attempted to call patient regarding need to reschedule their appointment with Dr. Radha Ortiz MD However, no answer. Call outcome: Left voice message with clinic number for return call.  Patient notified of canceled appointment.  Portal message sent.  Staff message sent to contact patient for rescheduling.        Future Appointments   Date Time Provider Department Center   4/3/2023  8:50 AM APPOINTMENT LAB, [facility]   4/3/2023 10:00 AM [facility] CT2 LIMIT 450 LBS [facility] CT SCAN [facility]   4/3/2023 11:30 AM Radha Ortiz MD [facility] TUMOR [facility]       Call time: 1 minutes

## 2025-04-08 ENCOUNTER — OFFICE (OUTPATIENT)
Dept: URBAN - METROPOLITAN AREA CLINIC 11 | Facility: CLINIC | Age: 50
End: 2025-04-08
Payer: COMMERCIAL

## 2025-04-08 VITALS
HEIGHT: 67 IN | DIASTOLIC BLOOD PRESSURE: 94 MMHG | HEART RATE: 82 BPM | WEIGHT: 150 LBS | SYSTOLIC BLOOD PRESSURE: 146 MMHG | OXYGEN SATURATION: 99 % | DIASTOLIC BLOOD PRESSURE: 98 MMHG | SYSTOLIC BLOOD PRESSURE: 143 MMHG

## 2025-04-08 DIAGNOSIS — K21.9 GASTRO-ESOPHAGEAL REFLUX DISEASE WITHOUT ESOPHAGITIS: ICD-10-CM

## 2025-04-08 DIAGNOSIS — R97.0 ELEVATED CARCINOEMBRYONIC ANTIGEN [CEA]: ICD-10-CM

## 2025-04-08 DIAGNOSIS — Z85.89 PERSONAL HISTORY OF MALIGNANT NEOPLASM OF OTHER ORGANS AND S: ICD-10-CM

## 2025-04-08 DIAGNOSIS — Z85.42 PERSONAL HISTORY OF MALIGNANT NEOPLASM OF OTHER PARTS OF UTE: ICD-10-CM

## 2025-04-08 DIAGNOSIS — Z85.850 PERSONAL HISTORY OF MALIGNANT NEOPLASM OF THYROID: ICD-10-CM

## 2025-04-08 PROCEDURE — 99204 OFFICE O/P NEW MOD 45 MIN: CPT

## 2025-04-08 RX ORDER — SODIUM SULFATE, POTASSIUM SULFATE, MAGNESIUM SULFATE 1.6; 3.13; 17.5 G/ML; G/ML; G/ML
SOLUTION, CONCENTRATE ORAL
Qty: 1 | Refills: 0 | Status: ACTIVE
Start: 2025-04-08

## 2025-04-22 ENCOUNTER — AMBULATORY SURGICAL CENTER (OUTPATIENT)
Dept: URBAN - METROPOLITAN AREA SURGERY 2 | Facility: SURGERY | Age: 50
End: 2025-04-22
Payer: COMMERCIAL

## 2025-04-22 VITALS
OXYGEN SATURATION: 96 % | OXYGEN SATURATION: 98 % | OXYGEN SATURATION: 98 % | HEART RATE: 79 BPM | DIASTOLIC BLOOD PRESSURE: 76 MMHG | HEART RATE: 98 BPM | HEART RATE: 81 BPM | HEART RATE: 76 BPM | TEMPERATURE: 97.7 F | TEMPERATURE: 98.2 F | OXYGEN SATURATION: 97 % | DIASTOLIC BLOOD PRESSURE: 69 MMHG | SYSTOLIC BLOOD PRESSURE: 113 MMHG | DIASTOLIC BLOOD PRESSURE: 53 MMHG | DIASTOLIC BLOOD PRESSURE: 71 MMHG | DIASTOLIC BLOOD PRESSURE: 76 MMHG | SYSTOLIC BLOOD PRESSURE: 103 MMHG | HEIGHT: 67 IN | WEIGHT: 225 LBS | HEART RATE: 79 BPM | RESPIRATION RATE: 18 BRPM | SYSTOLIC BLOOD PRESSURE: 113 MMHG | RESPIRATION RATE: 16 BRPM | HEART RATE: 81 BPM | DIASTOLIC BLOOD PRESSURE: 53 MMHG | OXYGEN SATURATION: 99 % | OXYGEN SATURATION: 99 % | RESPIRATION RATE: 16 BRPM | RESPIRATION RATE: 18 BRPM | DIASTOLIC BLOOD PRESSURE: 69 MMHG | HEART RATE: 76 BPM | OXYGEN SATURATION: 97 % | RESPIRATION RATE: 14 BRPM | WEIGHT: 225 LBS | OXYGEN SATURATION: 96 % | HEART RATE: 98 BPM | SYSTOLIC BLOOD PRESSURE: 126 MMHG | SYSTOLIC BLOOD PRESSURE: 105 MMHG | TEMPERATURE: 97.7 F | HEIGHT: 67 IN | SYSTOLIC BLOOD PRESSURE: 105 MMHG | TEMPERATURE: 98.2 F | SYSTOLIC BLOOD PRESSURE: 103 MMHG | SYSTOLIC BLOOD PRESSURE: 126 MMHG | DIASTOLIC BLOOD PRESSURE: 71 MMHG | RESPIRATION RATE: 14 BRPM

## 2025-04-22 DIAGNOSIS — D13.1 BENIGN NEOPLASM OF STOMACH: ICD-10-CM

## 2025-04-22 DIAGNOSIS — R97.0 ELEVATED CARCINOEMBRYONIC ANTIGEN [CEA]: ICD-10-CM

## 2025-04-22 DIAGNOSIS — K21.9 GASTRO-ESOPHAGEAL REFLUX DISEASE WITHOUT ESOPHAGITIS: ICD-10-CM

## 2025-04-22 DIAGNOSIS — K64.1 SECOND DEGREE HEMORRHOIDS: ICD-10-CM

## 2025-04-22 DIAGNOSIS — K29.50 UNSPECIFIED CHRONIC GASTRITIS WITHOUT BLEEDING: ICD-10-CM

## 2025-04-22 DIAGNOSIS — Z12.11 ENCOUNTER FOR SCREENING FOR MALIGNANT NEOPLASM OF COLON: ICD-10-CM

## 2025-04-22 DIAGNOSIS — D12.2 BENIGN NEOPLASM OF ASCENDING COLON: ICD-10-CM

## 2025-04-22 PROBLEM — K31.7 POLYP OF STOMACH AND DUODENUM: Status: ACTIVE | Noted: 2025-04-22

## 2025-04-22 PROBLEM — K63.5 POLYP OF COLON: Status: ACTIVE | Noted: 2025-04-22

## 2025-04-22 PROCEDURE — 45380 COLONOSCOPY AND BIOPSY: CPT | Mod: 33 | Performed by: INTERNAL MEDICINE

## 2025-04-22 PROCEDURE — 43239 EGD BIOPSY SINGLE/MULTIPLE: CPT | Mod: 51 | Performed by: INTERNAL MEDICINE

## 2025-04-22 RX ORDER — PANTOPRAZOLE SODIUM 20 MG/1
20 TABLET, DELAYED RELEASE ORAL
Qty: 30 | Refills: 11 | Status: ACTIVE
Start: 2025-04-22

## 2025-05-06 ENCOUNTER — OFFICE (OUTPATIENT)
Dept: URBAN - METROPOLITAN AREA PATHOLOGY 12 | Facility: PATHOLOGY | Age: 50
End: 2025-05-06
Payer: COMMERCIAL

## 2025-05-06 ENCOUNTER — AMBULATORY SURGICAL CENTER (OUTPATIENT)
Dept: URBAN - METROPOLITAN AREA SURGERY 3 | Facility: SURGERY | Age: 50
End: 2025-05-06
Payer: COMMERCIAL

## 2025-05-06 VITALS
DIASTOLIC BLOOD PRESSURE: 82 MMHG | OXYGEN SATURATION: 94 % | SYSTOLIC BLOOD PRESSURE: 135 MMHG | HEART RATE: 66 BPM | SYSTOLIC BLOOD PRESSURE: 133 MMHG | DIASTOLIC BLOOD PRESSURE: 78 MMHG | TEMPERATURE: 98.1 F | SYSTOLIC BLOOD PRESSURE: 124 MMHG | SYSTOLIC BLOOD PRESSURE: 133 MMHG | HEART RATE: 75 BPM | SYSTOLIC BLOOD PRESSURE: 148 MMHG | SYSTOLIC BLOOD PRESSURE: 140 MMHG | SYSTOLIC BLOOD PRESSURE: 135 MMHG | HEIGHT: 67 IN | HEART RATE: 89 BPM | WEIGHT: 225 LBS | HEART RATE: 89 BPM | HEART RATE: 78 BPM | HEART RATE: 75 BPM | OXYGEN SATURATION: 100 % | HEART RATE: 78 BPM | TEMPERATURE: 98 F | TEMPERATURE: 98 F | RESPIRATION RATE: 17 BRPM | RESPIRATION RATE: 16 BRPM | HEART RATE: 79 BPM | DIASTOLIC BLOOD PRESSURE: 67 MMHG | HEART RATE: 79 BPM | HEIGHT: 67 IN | SYSTOLIC BLOOD PRESSURE: 148 MMHG | OXYGEN SATURATION: 94 % | WEIGHT: 225 LBS | DIASTOLIC BLOOD PRESSURE: 85 MMHG | HEART RATE: 93 BPM | DIASTOLIC BLOOD PRESSURE: 82 MMHG | SYSTOLIC BLOOD PRESSURE: 158 MMHG | DIASTOLIC BLOOD PRESSURE: 95 MMHG | TEMPERATURE: 98.1 F | RESPIRATION RATE: 20 BRPM | RESPIRATION RATE: 17 BRPM | DIASTOLIC BLOOD PRESSURE: 85 MMHG | RESPIRATION RATE: 16 BRPM | RESPIRATION RATE: 19 BRPM | SYSTOLIC BLOOD PRESSURE: 140 MMHG | OXYGEN SATURATION: 95 % | OXYGEN SATURATION: 95 % | DIASTOLIC BLOOD PRESSURE: 78 MMHG | SYSTOLIC BLOOD PRESSURE: 158 MMHG | DIASTOLIC BLOOD PRESSURE: 67 MMHG | RESPIRATION RATE: 19 BRPM | RESPIRATION RATE: 18 BRPM | OXYGEN SATURATION: 96 % | OXYGEN SATURATION: 96 % | OXYGEN SATURATION: 100 % | DIASTOLIC BLOOD PRESSURE: 95 MMHG | RESPIRATION RATE: 18 BRPM | SYSTOLIC BLOOD PRESSURE: 124 MMHG | HEART RATE: 93 BPM | HEART RATE: 66 BPM | RESPIRATION RATE: 20 BRPM

## 2025-05-06 DIAGNOSIS — D13.1 BENIGN NEOPLASM OF STOMACH: ICD-10-CM

## 2025-05-06 DIAGNOSIS — K31.7 POLYP OF STOMACH AND DUODENUM: ICD-10-CM

## 2025-05-06 PROCEDURE — 88313 SPECIAL STAINS GROUP 2: CPT | Performed by: STUDENT IN AN ORGANIZED HEALTH CARE EDUCATION/TRAINING PROGRAM

## 2025-05-06 PROCEDURE — 88341 IMHCHEM/IMCYTCHM EA ADD ANTB: CPT | Performed by: STUDENT IN AN ORGANIZED HEALTH CARE EDUCATION/TRAINING PROGRAM

## 2025-05-06 PROCEDURE — 43251 EGD REMOVE LESION SNARE: CPT | Performed by: INTERNAL MEDICINE

## 2025-05-06 PROCEDURE — 88342 IMHCHEM/IMCYTCHM 1ST ANTB: CPT | Performed by: STUDENT IN AN ORGANIZED HEALTH CARE EDUCATION/TRAINING PROGRAM

## 2025-05-06 PROCEDURE — 88305 TISSUE EXAM BY PATHOLOGIST: CPT | Performed by: STUDENT IN AN ORGANIZED HEALTH CARE EDUCATION/TRAINING PROGRAM

## 2025-05-06 NOTE — SERVICEHPINOTES
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

## 2025-05-06 NOTE — SERVICENOTES
Prior to insertion of the scope, the patient had airway obstruction causing oxygen desaturation and need for supplemental biopsy and brief use of ambu bag.  After the obstruction resolved the exam was able to be started and completed without complication.

## 2025-05-06 NOTE — SERVICEHPINOTES
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

## 2025-05-12 LAB
GASTRO ONE PATHOLOGY: PDF REPORT: (no result)
GASTRO ONE PATHOLOGY: PDF REPORT: (no result)